# Patient Record
Sex: MALE | Race: WHITE | NOT HISPANIC OR LATINO | Employment: FULL TIME | URBAN - METROPOLITAN AREA
[De-identification: names, ages, dates, MRNs, and addresses within clinical notes are randomized per-mention and may not be internally consistent; named-entity substitution may affect disease eponyms.]

---

## 2020-11-22 ENCOUNTER — HOSPITAL ENCOUNTER (EMERGENCY)
Facility: HOSPITAL | Age: 25
Discharge: HOME | End: 2020-11-22
Attending: EMERGENCY MEDICINE
Payer: COMMERCIAL

## 2020-11-22 VITALS
TEMPERATURE: 98 F | HEIGHT: 66 IN | DIASTOLIC BLOOD PRESSURE: 72 MMHG | HEART RATE: 70 BPM | BODY MASS INDEX: 32.14 KG/M2 | RESPIRATION RATE: 16 BRPM | SYSTOLIC BLOOD PRESSURE: 124 MMHG | WEIGHT: 200 LBS | OXYGEN SATURATION: 97 %

## 2020-11-22 DIAGNOSIS — S61.212A LACERATION OF RIGHT MIDDLE FINGER WITHOUT FOREIGN BODY WITHOUT DAMAGE TO NAIL, INITIAL ENCOUNTER: Primary | ICD-10-CM

## 2020-11-22 PROCEDURE — 99283 EMERGENCY DEPT VISIT LOW MDM: CPT | Mod: 25,ER

## 2020-11-22 PROCEDURE — 12001 RPR S/N/AX/GEN/TRNK 2.5CM/<: CPT | Mod: ER

## 2020-11-22 PROCEDURE — 25000003 PHARM REV CODE 250: Mod: ER | Performed by: EMERGENCY MEDICINE

## 2020-11-22 RX ORDER — HYDROCODONE BITARTRATE AND ACETAMINOPHEN 5; 325 MG/1; MG/1
1 TABLET ORAL EVERY 4 HOURS PRN
Qty: 8 TABLET | Refills: 0 | Status: SHIPPED | OUTPATIENT
Start: 2020-11-22 | End: 2020-12-02

## 2020-11-22 RX ADMIN — BACITRACIN, NEOMYCIN, POLYMYXIN B 1 EACH: 400; 3.5; 5 OINTMENT TOPICAL at 02:11

## 2020-11-22 NOTE — DISCHARGE INSTRUCTIONS
Ibuprofen or Tylenol with hydrocodone for pain.  Please keep the wound clean and dry.  Please leave the dressing in place for 48 hr, then change it daily.  Suture removal in 10 days.  Please follow-up with your primary care doctor or the clinic above.

## 2020-11-22 NOTE — ED PROVIDER NOTES
Encounter Date: 11/22/2020       History     Chief Complaint   Patient presents with    Laceration     states he cut his right middle dorsal side finger across the middle knuckle. Small amount of bleeding in triage. he states it happened a few hours ago but the bleeding has not stopped      HPI   This 25-year-old male presents to the emergency room complaining of laceration on the dorsum of the middle finger.  He cut it on a piece of stainless steel at work.  He is otherwise well without other injuries or problems.  He denies deficits.  He reports that continues to bleed.  He is otherwise well  Review of patient's allergies indicates:  No Known Allergies  No past medical history on file.  No past surgical history on file.  No family history on file.  Social History     Tobacco Use    Smoking status: Not on file   Substance Use Topics    Alcohol use: Not on file    Drug use: Not on file     Review of Systems  The patient was questioned specifically with regard to the following.  General: Fever, chills, sweats. Neuro: Headache. Eyes: eye problems. ENT: Ear pain, sore throat. Cardiovascular: Chest pain. Respiratory: Cough, shortness of breath. Gastrointestinal: Abdominal pain, vomiting, diarrhea. Genitourinary: Painful urination.  Musculoskeletal: Arm and leg problems. Skin: Rash.  The review of systems was negative except for the following:  Laceration on the dorsum of the PIP of the right middle finger.  Physical Exam     Initial Vitals [11/22/20 1308]   BP Pulse Resp Temp SpO2   124/72 70 16 97.9 °F (36.6 °C) 97 %      MAP       --         Physical Exam  The patient was examined specifically for the following:   General:No significant distress, Good color, Warm and dry. Head and neck:Scalp atraumatic, Neck supple. Neurological:Appropriate conversation, Gross motor deficits. Eyes:Conjugate gaze, Clear corneas. ENT: No epistaxis. Cardiac: Regular rate and rhythm, Grossly normal heart tones. Pulmonary: Wheezing,  Rales. Gastrointestinal: Abdominal tenderness, Abdominal distention. Musculoskeletal: Extremity deformity, Apparent pain with range of motion of the joints. Skin: Rash.   The findings on examination were normal except for the following:  The lungs are clear.  The heart tones are normal.  The abdomen is soft.  The patient has a 1 cm laceration across the right dorsum of the PIP of the middle finger.   ED Course   Lac Repair    Date/Time: 11/22/2020 2:31 PM  Performed by: Domenic Jeong MD  Authorized by: Domenic Jeong MD     Consent:     Consent obtained:  Verbal    Consent given by:  Patient    Risks discussed:  Poor wound healing  Anesthesia (see MAR for exact dosages):     Anesthesia method:  None  Laceration details:     Location:  Finger    Finger location:  R long finger    Length (cm):  1  Pre-procedure details:     Preparation:  Patient was prepped and draped in usual sterile fashion  Exploration:     Wound extent: no foreign bodies/material noted    Treatment:     Area cleansed with:  Saline    Amount of cleaning:  Standard    Visualized foreign bodies/material removed: no    Skin repair:     Repair method:  Sutures  Approximation:     Approximation:  Close  Post-procedure details:     Dressing:  Antibiotic ointment and sterile dressing  Comments:      Neurovascular and tendon function were intact.  There was no obvious deep structure damage.      Labs Reviewed - No data to display       Imaging Results    None       Medical decision making:  This patient had 1 cm laceration across the dorsum this finger.  It was cut with stainless steel.  I doubt foreign body.  Doubt deep structure damage.  The laceration was repaired with a single 4-0 nylon stitch.  There is no anesthesia.  The wound was cleansed with normal saline.  I will discharge to outpatient evaluation and treatment                                 Clinical Impression:     ICD-10-CM ICD-9-CM   1. Laceration of right middle finger without foreign  body without damage to nail, initial encounter  S61.212A 883.0                          ED Disposition Condition    Discharge Stable        ED Prescriptions     Medication Sig Dispense Start Date End Date Auth. Provider    HYDROcodone-acetaminophen (NORCO) 5-325 mg per tablet Take 1 tablet by mouth every 4 (four) hours as needed. 8 tablet 11/22/2020 12/2/2020 Domenic Jeong MD        Follow-up Information     Follow up With Specialties Details Why Contact Info    Kirit Burciaga MD Internal Medicine, Wound Care In 10 days  [address] [facility] [address]  [phone]                                         Domenic Jeong MD  11/22/20 6925

## 2020-12-03 ENCOUNTER — HOSPITAL ENCOUNTER (EMERGENCY)
Facility: HOSPITAL | Age: 25
Discharge: HOME | End: 2020-12-03
Attending: EMERGENCY MEDICINE
Payer: COMMERCIAL

## 2020-12-03 VITALS
RESPIRATION RATE: 17 BRPM | BODY MASS INDEX: 33.75 KG/M2 | TEMPERATURE: 98 F | DIASTOLIC BLOOD PRESSURE: 78 MMHG | HEART RATE: 67 BPM | OXYGEN SATURATION: 96 % | WEIGHT: 210 LBS | SYSTOLIC BLOOD PRESSURE: 127 MMHG | HEIGHT: 66 IN

## 2020-12-03 DIAGNOSIS — Z48.02 VISIT FOR SUTURE REMOVAL: Primary | ICD-10-CM

## 2020-12-03 PROCEDURE — 99281 EMR DPT VST MAYX REQ PHY/QHP: CPT | Mod: ER
